# Patient Record
Sex: FEMALE | URBAN - METROPOLITAN AREA
[De-identification: names, ages, dates, MRNs, and addresses within clinical notes are randomized per-mention and may not be internally consistent; named-entity substitution may affect disease eponyms.]

---

## 2020-09-29 ENCOUNTER — OFFICE VISIT (OUTPATIENT)
Dept: URBAN - METROPOLITAN AREA CLINIC 37 | Facility: CLINIC | Age: 50
End: 2020-09-29

## 2020-09-29 VITALS — HEIGHT: 62 IN | BODY MASS INDEX: 22.45 KG/M2 | WEIGHT: 122 LBS

## 2020-09-29 PROBLEM — 275978004 SCREENING FOR MALIGNANT NEOPLASM OF COLON: Status: ACTIVE | Noted: 2020-09-28

## 2020-09-29 PROBLEM — 34713006: Status: ACTIVE | Noted: 2020-09-29

## 2020-09-29 RX ORDER — SODIUM SULFATE, POTASSIUM SULFATE, MAGNESIUM SULFATE 17.5; 3.13; 1.6 G/ML; G/ML; G/ML
AS DIRECTED SOLUTION, CONCENTRATE ORAL ONCE
Qty: 1 KIT | Refills: 0 | OUTPATIENT
Start: 2020-09-29

## 2020-09-29 RX ORDER — ERGOCALCIFEROL 1.25 MG/1
1 CAPSULE CAPSULE ORAL
Qty: 4 | Status: ACTIVE | COMMUNITY

## 2020-09-29 NOTE — HPI-MIGRATED HPI
Interim investigations : Labs done on: ->  * 03/12/2020, ordered by PCP:  - CBC: WBC:4.8; RBC: 4.40; Hgb: 12.9; Hct: 38.5; Plt: 252;   Colorectal cancer screening : Patient is here for initial consultation for -> first time screening colonoscopy;   Colorectal cancer screening : Patients denies -> rectal bleeding, change in bowel habits, constipation, diarrhea, or abdominal pain;   Colorectal cancer screening : Current bowel movement patterns -> One soft BM daily;   Colorectal cancer screening : Family history of GI malignancy: -> Denies;     Colorectal cancer screening : Denies dysphagia or odynophagia Currently taking anticoagulants/NSAIDs: NO;

## 2020-10-01 ENCOUNTER — LAB OUTSIDE AN ENCOUNTER (OUTPATIENT)
Dept: URBAN - METROPOLITAN AREA CLINIC 37 | Facility: CLINIC | Age: 50
End: 2020-10-01

## 2020-11-16 ENCOUNTER — OFFICE VISIT (OUTPATIENT)
Dept: URBAN - METROPOLITAN AREA MEDICAL CENTER 10 | Facility: MEDICAL CENTER | Age: 50
End: 2020-11-16

## 2020-12-01 ENCOUNTER — OFFICE VISIT (OUTPATIENT)
Dept: URBAN - METROPOLITAN AREA CLINIC 37 | Facility: CLINIC | Age: 50
End: 2020-12-01

## 2020-12-01 RX ORDER — ERGOCALCIFEROL 1.25 MG/1
1 CAPSULE CAPSULE ORAL
Qty: 4 | Status: ACTIVE | COMMUNITY

## 2020-12-01 NOTE — HPI-MIGRATED HPI
Post-op OV : After Colonoscopy on -> 11/16/2020, at which time 5 small polyps were detected and resected. Histology showed they were all hyperplastic polyps.  Non-bleeding grade II internal and external hemorrhoids were found during retroflexion but not banded. Good bowel prep;   Post-op OV : Patient denies -> rectal bleeding, fever, nausea & vomiting since the procedure date;   Post-op OV : Patient -> denies any new changes in his/her health status since last OV;   Post-op OV : Patient reports of current symptoms -> Denies GI symptoms Current BM: soft BM daily;

## 2020-12-07 ENCOUNTER — OFFICE VISIT (OUTPATIENT)
Dept: URBAN - METROPOLITAN AREA MEDICAL CENTER 10 | Facility: MEDICAL CENTER | Age: 50
End: 2020-12-07

## 2020-12-22 ENCOUNTER — OFFICE VISIT (OUTPATIENT)
Dept: URBAN - METROPOLITAN AREA CLINIC 37 | Facility: CLINIC | Age: 50
End: 2020-12-22